# Patient Record
Sex: MALE | Race: WHITE
[De-identification: names, ages, dates, MRNs, and addresses within clinical notes are randomized per-mention and may not be internally consistent; named-entity substitution may affect disease eponyms.]

---

## 2023-11-02 RX ORDER — OMEPRAZOLE 20 MG/1
20 TABLET, DELAYED RELEASE ORAL 2 TIMES DAILY
COMMUNITY
Start: 2023-06-27

## 2023-11-02 RX ORDER — ONDANSETRON 4 MG/1
4 TABLET, ORALLY DISINTEGRATING ORAL EVERY 8 HOURS PRN
COMMUNITY
Start: 2023-08-02

## 2023-11-02 RX ORDER — FUROSEMIDE 20 MG/1
20 TABLET ORAL DAILY
COMMUNITY
Start: 2023-10-04

## 2023-11-02 RX ORDER — ERGOCALCIFEROL 1.25 MG/1
50000 CAPSULE ORAL
COMMUNITY
Start: 2023-10-11

## 2023-11-02 RX ORDER — NICOTINE POLACRILEX 2 MG
1 LOZENGE BUCCAL DAILY
COMMUNITY
Start: 2023-10-23

## 2023-11-02 RX ORDER — LACTULOSE 10 G/10G
45 SOLUTION ORAL 3 TIMES DAILY
COMMUNITY
Start: 2023-10-23

## 2023-11-02 RX ORDER — LANOLIN ALCOHOL/MO/W.PET/CERES
100 CREAM (GRAM) TOPICAL DAILY
COMMUNITY
Start: 2023-06-30

## 2023-11-02 RX ORDER — POLYETHYLENE GLYCOL 3350 17 G/17G
17 POWDER, FOR SOLUTION ORAL DAILY
COMMUNITY
Start: 2023-10-04 | End: 2023-11-25

## 2023-11-02 RX ORDER — SPIRONOLACTONE 50 MG/1
50 TABLET, FILM COATED ORAL 2 TIMES DAILY
COMMUNITY
Start: 2023-10-11

## 2023-11-06 ENCOUNTER — HOSPITAL ENCOUNTER (OUTPATIENT)
Facility: HOSPITAL | Age: 43
Setting detail: SPECIMEN
Discharge: HOME | End: 2023-11-09

## 2023-11-06 ENCOUNTER — OFFICE VISIT (OUTPATIENT)
Age: 43
End: 2023-11-06
Payer: COMMERCIAL

## 2023-11-06 VITALS
OXYGEN SATURATION: 99 % | HEIGHT: 70 IN | WEIGHT: 155 LBS | BODY MASS INDEX: 22.19 KG/M2 | TEMPERATURE: 97.7 F | SYSTOLIC BLOOD PRESSURE: 134 MMHG | DIASTOLIC BLOOD PRESSURE: 95 MMHG | HEART RATE: 99 BPM

## 2023-11-06 DIAGNOSIS — K76.9 PLEURAL EFFUSION ASSOCIATED WITH HEPATIC DISORDER: ICD-10-CM

## 2023-11-06 DIAGNOSIS — K76.82 HEPATIC ENCEPHALOPATHY (HCC): ICD-10-CM

## 2023-11-06 DIAGNOSIS — K70.30 ALCOHOLIC CIRRHOSIS OF LIVER WITHOUT ASCITES (HCC): Primary | ICD-10-CM

## 2023-11-06 DIAGNOSIS — Z95.828 S/P TIPS (TRANSJUGULAR INTRAHEPATIC PORTOSYSTEMIC SHUNT): ICD-10-CM

## 2023-11-06 DIAGNOSIS — K70.30 ALCOHOLIC CIRRHOSIS OF LIVER WITHOUT ASCITES (HCC): ICD-10-CM

## 2023-11-06 DIAGNOSIS — J91.8 PLEURAL EFFUSION ASSOCIATED WITH HEPATIC DISORDER: ICD-10-CM

## 2023-11-06 DIAGNOSIS — K70.9 ALCOHOL INDUCED LIVER DISORDER (HCC): ICD-10-CM

## 2023-11-06 DIAGNOSIS — F10.91 ALCOHOL USE DISORDER IN REMISSION: ICD-10-CM

## 2023-11-06 PROBLEM — I85.00 ESOPHAGEAL VARICES (HCC): Status: ACTIVE | Noted: 2023-11-06

## 2023-11-06 PROBLEM — K74.60 CIRRHOSIS (HCC): Status: ACTIVE | Noted: 2023-11-06

## 2023-11-06 PROBLEM — R18.8 ASCITES: Status: ACTIVE | Noted: 2023-11-06

## 2023-11-06 LAB
AMMONIA PLAS-SCNC: 91 UMOL/L (ref 11–32)
LABCORP SPECIMEN COLLECTION: NORMAL

## 2023-11-06 PROCEDURE — 82140 ASSAY OF AMMONIA: CPT

## 2023-11-06 PROCEDURE — 99205 OFFICE O/P NEW HI 60 MIN: CPT | Performed by: INTERNAL MEDICINE

## 2023-11-06 RX ORDER — HYDROCODONE BITARTRATE AND ACETAMINOPHEN 5; 325 MG/1; MG/1
TABLET ORAL
COMMUNITY
Start: 2023-08-28 | End: 2023-11-25

## 2023-11-06 RX ORDER — LACTULOSE 10 G/15ML
SOLUTION ORAL
COMMUNITY
Start: 2023-10-24 | End: 2023-11-06

## 2023-11-06 RX ORDER — OXYCODONE HYDROCHLORIDE 5 MG/1
5 TABLET ORAL EVERY 6 HOURS PRN
COMMUNITY
Start: 2023-08-03 | End: 2023-11-25

## 2023-11-06 RX ORDER — AMLODIPINE BESYLATE 5 MG/1
TABLET ORAL
COMMUNITY
Start: 2018-09-10 | End: 2023-11-25

## 2023-11-06 NOTE — PROGRESS NOTES
history and multiple medical issues, performing the examination, explaining and then documenting the natural history of the liver disease, cirrhosis, the complications of cirrhosis, treatment options, various issues related to liver transplantation.       Victor Hugo Aguilar MD  [facility]  [address], [address] [place] [address]  [id number] [place] [address], [address]

## 2023-11-07 LAB
ALBUMIN SERPL-MCNC: 3.7 G/DL (ref 4.1–5.1)
ALP SERPL-CCNC: 99 IU/L (ref 44–121)
ALT SERPL-CCNC: 25 IU/L (ref 0–44)
AST SERPL-CCNC: 48 IU/L (ref 0–40)
BASOPHILS # BLD AUTO: 0.1 X10E3/UL (ref 0–0.2)
BASOPHILS NFR BLD AUTO: 1 %
BILIRUB DIRECT SERPL-MCNC: 2.13 MG/DL (ref 0–0.4)
BILIRUB SERPL-MCNC: 5.4 MG/DL (ref 0–1.2)
BUN SERPL-MCNC: 24 MG/DL (ref 6–24)
BUN/CREAT SERPL: 20 (ref 9–20)
CALCIUM SERPL-MCNC: 9.9 MG/DL (ref 8.7–10.2)
CHLORIDE SERPL-SCNC: 99 MMOL/L (ref 96–106)
CO2 SERPL-SCNC: 17 MMOL/L (ref 20–29)
CREAT SERPL-MCNC: 1.2 MG/DL (ref 0.76–1.27)
EGFRCR SERPLBLD CKD-EPI 2021: 76 ML/MIN/1.73
EOSINOPHIL # BLD AUTO: 0.1 X10E3/UL (ref 0–0.4)
EOSINOPHIL NFR BLD AUTO: 1 %
ERYTHROCYTE [DISTWIDTH] IN BLOOD BY AUTOMATED COUNT: 13.1 % (ref 11.6–15.4)
GLUCOSE SERPL-MCNC: 231 MG/DL (ref 70–99)
HCT VFR BLD AUTO: 34.3 % (ref 37.5–51)
HGB BLD-MCNC: 12.4 G/DL (ref 13–17.7)
IMM GRANULOCYTES # BLD AUTO: 0 X10E3/UL (ref 0–0.1)
IMM GRANULOCYTES NFR BLD AUTO: 0 %
INR PPP: 1.2 (ref 0.9–1.2)
LYMPHOCYTES # BLD AUTO: 1.3 X10E3/UL (ref 0.7–3.1)
LYMPHOCYTES NFR BLD AUTO: 27 %
MCH RBC QN AUTO: 35.4 PG (ref 26.6–33)
MCHC RBC AUTO-ENTMCNC: 36.2 G/DL (ref 31.5–35.7)
MCV RBC AUTO: 98 FL (ref 79–97)
MONOCYTES # BLD AUTO: 0.6 X10E3/UL (ref 0.1–0.9)
MONOCYTES NFR BLD AUTO: 13 %
NEUTROPHILS # BLD AUTO: 2.8 X10E3/UL (ref 1.4–7)
NEUTROPHILS NFR BLD AUTO: 58 %
PLATELET # BLD AUTO: 101 X10E3/UL (ref 150–450)
POTASSIUM SERPL-SCNC: 5.3 MMOL/L (ref 3.5–5.2)
PROT SERPL-MCNC: 7.6 G/DL (ref 6–8.5)
PROTHROMBIN TIME: 13.2 SEC (ref 9.1–12)
RBC # BLD AUTO: 3.5 X10E6/UL (ref 4.14–5.8)
SODIUM SERPL-SCNC: 131 MMOL/L (ref 134–144)
WBC # BLD AUTO: 4.8 X10E3/UL (ref 3.4–10.8)

## 2023-11-08 LAB
AFP L3 MFR SERPL: 10.6 % (ref 0–9.9)
AFP SERPL-MCNC: 9.5 NG/ML (ref 0–6.9)
SPECIMEN STATUS REPORT: NORMAL

## 2023-11-10 LAB — AMMONIA PLAS-MCNC: 224 UG/DL (ref 40–200)

## 2023-11-11 LAB
PETH BLD QL SCN: NEGATIVE
PETH BLD-MCNC: NEGATIVE NG/ML

## 2023-12-04 ENCOUNTER — HOSPITAL ENCOUNTER (OUTPATIENT)
Facility: HOSPITAL | Age: 43
Setting detail: SPECIMEN
Discharge: HOME | End: 2023-12-07

## 2023-12-04 DIAGNOSIS — K74.60 CIRRHOSIS OF LIVER WITHOUT ASCITES, UNSPECIFIED HEPATIC CIRRHOSIS TYPE (HCC): ICD-10-CM

## 2023-12-04 LAB — LABCORP SPECIMEN COLLECTION: NORMAL

## 2023-12-05 LAB
A1AT SERPL-MCNC: 164 MG/DL (ref 101–187)
ABO GROUP BLD: NORMAL
BUN SERPL-MCNC: 17 MG/DL (ref 6–24)
BUN/CREAT SERPL: 17 (ref 9–20)
CALCIUM SERPL-MCNC: 10 MG/DL (ref 8.7–10.2)
CHLORIDE SERPL-SCNC: 101 MMOL/L (ref 96–106)
CHOLEST SERPL-MCNC: 194 MG/DL (ref 100–199)
CMV IGG SERPL IA-ACNC: <0.6 U/ML (ref 0–0.59)
CMV IGM SERPL IA-ACNC: <30 AU/ML (ref 0–29.9)
CO2 SERPL-SCNC: 15 MMOL/L (ref 20–29)
CREAT SERPL-MCNC: 1.03 MG/DL (ref 0.76–1.27)
EGFRCR SERPLBLD CKD-EPI 2021: 92 ML/MIN/1.73
GLUCOSE SERPL-MCNC: 166 MG/DL (ref 70–99)
HBA1C MFR BLD: 5.6 % (ref 4.8–5.6)
HDLC SERPL-MCNC: 71 MG/DL
HIV 1+2 AB+HIV1 P24 AG SERPL QL IA: NON REACTIVE
HSV1 IGG SER IA-ACNC: 19 INDEX (ref 0–0.9)
HSV2 IGG SER IA-ACNC: <0.91 INDEX (ref 0–0.9)
INR PPP: 1.2 (ref 0.9–1.2)
LDLC SERPL CALC-MCNC: 108 MG/DL (ref 0–99)
POTASSIUM SERPL-SCNC: 4.1 MMOL/L (ref 3.5–5.2)
PROTHROMBIN TIME: 13.2 SEC (ref 9.1–12)
RH BLD: NEGATIVE
SMA IGG SER-ACNC: 33 UNITS (ref 0–19)
SODIUM SERPL-SCNC: 137 MMOL/L (ref 134–144)
SPECIMEN STATUS REPORT: NORMAL
TREPONEMA PALLIDUM IGG+IGM AB [PRESENCE] IN SERUM OR PLASMA BY IMMUNOASSAY: NON REACTIVE
TRIGL SERPL-MCNC: 82 MG/DL (ref 0–149)
TSH SERPL DL<=0.005 MIU/L-ACNC: 2.25 UIU/ML (ref 0.45–4.5)
VLDLC SERPL CALC-MCNC: 15 MG/DL (ref 5–40)
VZV IGG SER IA-ACNC: >4000 INDEX

## 2023-12-06 LAB
ALBUMIN SERPL-MCNC: 3.7 G/DL (ref 4.1–5.1)
ALP SERPL-CCNC: 105 IU/L (ref 44–121)
ALT SERPL-CCNC: 20 IU/L (ref 0–44)
AST SERPL-CCNC: 40 IU/L (ref 0–40)
BASOPHILS # BLD AUTO: 0.1 X10E3/UL (ref 0–0.2)
BASOPHILS NFR BLD AUTO: 1 %
BILIRUB DIRECT SERPL-MCNC: 1.66 MG/DL (ref 0–0.4)
BILIRUB SERPL-MCNC: 4.1 MG/DL (ref 0–1.2)
EOSINOPHIL # BLD AUTO: 0.1 X10E3/UL (ref 0–0.4)
EOSINOPHIL NFR BLD AUTO: 2 %
ERYTHROCYTE [DISTWIDTH] IN BLOOD BY AUTOMATED COUNT: 12.1 % (ref 11.6–15.4)
HCT VFR BLD AUTO: 32.5 % (ref 37.5–51)
HGB BLD-MCNC: 12.2 G/DL (ref 13–17.7)
IMM GRANULOCYTES # BLD AUTO: 0 X10E3/UL (ref 0–0.1)
IMM GRANULOCYTES NFR BLD AUTO: 0 %
LYMPHOCYTES # BLD AUTO: 1.5 X10E3/UL (ref 0.7–3.1)
LYMPHOCYTES NFR BLD AUTO: 30 %
MCH RBC QN AUTO: 36.4 PG (ref 26.6–33)
MCHC RBC AUTO-ENTMCNC: 37.5 G/DL (ref 31.5–35.7)
MCV RBC AUTO: 97 FL (ref 79–97)
MONOCYTES # BLD AUTO: 0.5 X10E3/UL (ref 0.1–0.9)
MONOCYTES NFR BLD AUTO: 11 %
MORPHOLOGY BLD-IMP: ABNORMAL
NEUTROPHILS # BLD AUTO: 2.8 X10E3/UL (ref 1.4–7)
NEUTROPHILS NFR BLD AUTO: 56 %
PLATELET # BLD AUTO: 99 X10E3/UL (ref 150–450)
PROT SERPL-MCNC: 7.8 G/DL (ref 6–8.5)
RBC # BLD AUTO: 3.35 X10E6/UL (ref 4.14–5.8)
WBC # BLD AUTO: 5 X10E3/UL (ref 3.4–10.8)

## 2023-12-07 LAB
AMPHETAMINES BLD QL SCN: NEGATIVE NG/ML
BARBITURATES SERPLBLD QL: NEGATIVE UG/ML
CANNABINOIDS BLD QL SCN: NEGATIVE NG/ML
COCAINE+BZE SERPLBLD QL SCN: NEGATIVE NG/ML
EBV NA IGG SER IA-ACNC: >600 U/ML (ref 0–17.9)
EBV VCA IGG SER IA-ACNC: >600 U/ML (ref 0–17.9)
EBV VCA IGM SER IA-ACNC: <36 U/ML (ref 0–35.9)
GAMMA INTERFERON BACKGROUND BLD IA-ACNC: 0 IU/ML
M TB IFN-G BLD-IMP: NEGATIVE
M TB IFN-G CD4+ T-CELLS BLD-ACNC: 0.01 IU/ML
M TBIFN-G CD4+ CD8+T-CELLS BLD-ACNC: 0 IU/ML
MITOGEN IGNF BLD-ACNC: >10 IU/ML
OPIATES BLD QL SCN: NEGATIVE NG/ML
OXYCODONE+OXYMORPHONE SERPLBLD QL SCN: NEGATIVE NG/ML
PCP BLD QL SCN: NEGATIVE NG/ML
SERVICE CMNT-IMP: ABNORMAL
SERVICE CMNT-IMP: NORMAL

## 2023-12-09 LAB
PETH BLD QL SCN: NEGATIVE
PETH BLD-MCNC: NEGATIVE NG/ML

## 2023-12-13 LAB
ANA SER QL IF: POSITIVE
ANA SPECKLED TITR SER: ABNORMAL {TITER}
LABORATORY COMMENT REPORT: ABNORMAL

## 2024-01-09 RX ORDER — PHENOL 1.4 %
AEROSOL, SPRAY (ML) MUCOUS MEMBRANE
COMMUNITY
Start: 2023-12-07

## 2024-01-10 ENCOUNTER — OFFICE VISIT (OUTPATIENT)
Age: 44
End: 2024-01-10

## 2024-01-10 VITALS
WEIGHT: 166 LBS | BODY MASS INDEX: 23.77 KG/M2 | SYSTOLIC BLOOD PRESSURE: 127 MMHG | HEART RATE: 82 BPM | HEIGHT: 70 IN | DIASTOLIC BLOOD PRESSURE: 84 MMHG | OXYGEN SATURATION: 99 %

## 2024-01-10 DIAGNOSIS — Z01.818 ENCOUNTER FOR PRE-TRANSPLANT EVALUATION FOR LIVER TRANSPLANT: ICD-10-CM

## 2024-01-10 DIAGNOSIS — K74.60 CIRRHOSIS OF LIVER WITHOUT ASCITES, UNSPECIFIED HEPATIC CIRRHOSIS TYPE (HCC): ICD-10-CM

## 2024-01-10 DIAGNOSIS — K74.60 CIRRHOSIS OF LIVER WITHOUT ASCITES, UNSPECIFIED HEPATIC CIRRHOSIS TYPE (HCC): Primary | ICD-10-CM

## 2024-01-10 RX ORDER — CITALOPRAM HYDROBROMIDE 10 MG/1
10 TABLET ORAL DAILY
COMMUNITY
Start: 2024-01-08

## 2024-01-10 RX ORDER — OMEPRAZOLE 40 MG/1
CAPSULE, DELAYED RELEASE ORAL
COMMUNITY
Start: 2024-01-08

## 2024-01-10 RX ORDER — RIFAXIMIN 550 MG/1
550 TABLET ORAL 2 TIMES DAILY
COMMUNITY
Start: 2023-12-30

## 2024-01-10 NOTE — PATIENT INSTRUCTIONS
I will fax orders for a chest x-ray, abdominal MRI, and bone density to [facility]. Please call the scheduling department next week, as it can take 24-48 hours for the orders to be put into the system    Phone: [phone]

## 2024-01-10 NOTE — PROGRESS NOTES
(PRILOSEC OTC) 20 mg, Oral, 2 TIMES DAILY    omeprazole (PRILOSEC) 40 MG delayed release capsule     ondansetron (ZOFRAN-ODT) 4 mg, Oral, EVERY 8 HOURS PRN    spironolactone (ALDACTONE) 50 mg, Oral, DAILY    thiamine 100 mg, Oral, DAILY    vitamin D (ERGOCALCIFEROL) 50,000 Units, Oral, EVERY 7 DAYS    Xifaxan 550 mg, Oral, 2 TIMES DAILY       SYSTEM REVIEW NOT RELATED TO LIVER DISEASE OR REVIEWED ABOVE:  Constitution systems: Negative for fever, chills, weight gain, weight loss.   Eyes: Negative for visual changes.  ENT: Negative for sore throat, painful swallowing.   Respiratory: Negative for cough, hemoptysis, SOB.   Cardiology: Negative for chest pain, palpitations.  GI:  Negative for constipation or diarrhea.  : Negative for urinary frequency, dysuria, hematuria, nocturia.   Skin: Negative for rash.  Hematology: Negative for easy bruising, blood clots.    Musculo-skelatal: Negative for back pain, muscle pain, weakness.  Neurologic: Negative for headaches, dizziness, vertigo, memory problems not related to HE.  Psychology: Negative for anxiety, depression.       FAMILY HISTORY:  The father  of MI.    The mother Has/had the following chronic disease(s): heart disease.    There is no family history of liver disease.        SOCIAL HISTORY:  The patient is .    The patient has 3 children,   The patient stopped using tobacco products in .    The patient will occasional vape nicotine  The patient has been abstinent from alcohol since 2023.    The patient used to work as  and then as [employer]   The patient has not worked since 2023.    The patient has applied for disability      PHYSICAL EXAMINATION:  /84 (Site: Left Upper Arm, Position: Sitting, Cuff Size: Medium Adult)   Pulse 82   Ht 1.778 m (5' 10\")   Wt 75.3 kg (166 lb)   SpO2 99%   BMI 23.82 kg/m²     General: No acute distress.   Eyes: Sclera anicteric.   ENT: No oral lesions.  Thyroid normal.  Nodes: No

## 2024-01-11 ENCOUNTER — CLINICAL DOCUMENTATION (OUTPATIENT)
Age: 44
End: 2024-01-11

## 2024-01-18 ENCOUNTER — TELEPHONE (OUTPATIENT)
Age: 44
End: 2024-01-18

## 2024-01-18 NOTE — TELEPHONE ENCOUNTER
Received VM from patient indicating he's had labs drawn, testing has been scheduled, and the chest x-ray order needs to be faxed.     Re-faxed x-ray order to [facility] scheduling at [phone]. No lab results available via [facility] Everywhere.     Return call placed to patient. He confirmed labs were drawn yesterday. I told him results will be reviewed with Dr. Kennedy when available. MRI is scheduled on 1/28/2024 and bone density is scheduled on 2/5/2024.

## 2024-01-19 LAB
INR PPP: 1.3 (ref 0.9–1.2)
PROTHROMBIN TIME: 13.6 SEC (ref 9.1–12)
SPECIMEN STATUS REPORT: NORMAL

## 2024-02-08 ENCOUNTER — CLINICAL DOCUMENTATION (OUTPATIENT)
Age: 44
End: 2024-02-08

## 2024-02-08 NOTE — PROGRESS NOTES
2/8/2024    Rcvd OV note from [facility] dos 2/7/2024.    Scanned into media for reference.    efs

## 2024-04-10 ENCOUNTER — OFFICE VISIT (OUTPATIENT)
Age: 44
End: 2024-04-10
Payer: MEDICAID

## 2024-04-10 VITALS
WEIGHT: 181.2 LBS | BODY MASS INDEX: 25.94 KG/M2 | SYSTOLIC BLOOD PRESSURE: 125 MMHG | TEMPERATURE: 98.5 F | HEART RATE: 80 BPM | DIASTOLIC BLOOD PRESSURE: 84 MMHG | HEIGHT: 70 IN | RESPIRATION RATE: 20 BRPM | OXYGEN SATURATION: 98 %

## 2024-04-10 DIAGNOSIS — K70.30 ALCOHOLIC CIRRHOSIS OF LIVER WITHOUT ASCITES (HCC): Primary | ICD-10-CM

## 2024-04-10 PROCEDURE — 99214 OFFICE O/P EST MOD 30 MIN: CPT | Performed by: INTERNAL MEDICINE

## 2024-04-10 RX ORDER — GLIMEPIRIDE 1 MG/1
1 TABLET ORAL
COMMUNITY
Start: 2024-04-09

## 2024-04-10 NOTE — PROGRESS NOTES
DAILY    omeprazole (PRILOSEC OTC) 20 mg, 2 TIMES DAILY    omeprazole (PRILOSEC) 40 MG delayed release capsule     ondansetron (ZOFRAN-ODT) 4 mg, EVERY 8 HOURS PRN    spironolactone (ALDACTONE) 50 mg, Oral, DAILY    thiamine 100 mg, Oral, DAILY    vitamin D (ERGOCALCIFEROL) 50,000 Units, Oral, EVERY 7 DAYS    Xifaxan 550 mg, Oral, 2 TIMES DAILY       SYSTEM REVIEW NOT RELATED TO LIVER DISEASE OR REVIEWED ABOVE:  Constitution systems: Negative for fever, chills, weight gain, weight loss.   Eyes: Negative for visual changes.  ENT: Negative for sore throat, painful swallowing.   Respiratory: Negative for cough, hemoptysis, SOB.   Cardiology: Negative for chest pain, palpitations.  GI:  Negative for constipation or diarrhea.  : Negative for urinary frequency, dysuria, hematuria, nocturia.   Skin: Negative for rash.  Hematology: Negative for easy bruising, blood clots.    Musculo-skelatal: Negative for back pain, muscle pain, weakness.  Neurologic: Negative for headaches, dizziness, vertigo, memory problems not related to HE.  Psychology: Negative for anxiety, depression.       FAMILY HISTORY:  The father  of MI.    The mother Has/had the following chronic disease(s): heart disease.    There is no family history of liver disease.        SOCIAL HISTORY:  The patient is .    The patient has 3 children,   The patient stopped using tobacco products in .    The patient will occasional vape nicotine  The patient has been abstinent from alcohol since 2023.    The patient used to work as  and then as [employer]   The patient has not worked since 2023.    The patient has applied for disability      PHYSICAL EXAMINATION:  /84 (Site: Left Upper Arm, Position: Sitting, Cuff Size: Small Adult)   Pulse 80   Temp 98.5 °F (36.9 °C) (Oral)   Resp 20   Ht 1.778 m (5' 10\")   Wt 82.2 kg (181 lb 3.2 oz)   SpO2 98%   BMI 26.00 kg/m²     General: No acute distress.   Eyes: Sclera anicteric.

## 2024-11-06 ENCOUNTER — OFFICE VISIT (OUTPATIENT)
Age: 44
End: 2024-11-06
Payer: MEDICAID

## 2024-11-06 VITALS
OXYGEN SATURATION: 98 % | TEMPERATURE: 98.2 F | HEART RATE: 87 BPM | BODY MASS INDEX: 31.58 KG/M2 | SYSTOLIC BLOOD PRESSURE: 130 MMHG | HEIGHT: 70 IN | WEIGHT: 220.6 LBS | DIASTOLIC BLOOD PRESSURE: 97 MMHG

## 2024-11-06 DIAGNOSIS — K70.30 ALCOHOLIC CIRRHOSIS OF LIVER WITHOUT ASCITES (HCC): ICD-10-CM

## 2024-11-06 DIAGNOSIS — K70.30 ALCOHOLIC CIRRHOSIS OF LIVER WITHOUT ASCITES (HCC): Primary | ICD-10-CM

## 2024-11-06 PROCEDURE — 99214 OFFICE O/P EST MOD 30 MIN: CPT | Performed by: NURSE PRACTITIONER

## 2024-11-06 RX ORDER — SELENIUM SULFIDE 2.5 MG/100ML
1 LOTION TOPICAL PRN
COMMUNITY
Start: 2024-08-23

## 2024-11-06 RX ORDER — KETOCONAZOLE 20 MG/G
CREAM TOPICAL
COMMUNITY
Start: 2024-08-31

## 2024-11-06 RX ORDER — CYPROHEPTADINE HYDROCHLORIDE 4 MG/1
4 TABLET ORAL 3 TIMES DAILY
Qty: 90 TABLET | Refills: 1 | Status: SHIPPED | OUTPATIENT
Start: 2024-11-06

## 2024-11-06 NOTE — PATIENT INSTRUCTIONS
Please begin taking a daily probiotic.  Please begin taking daily branch chain amino acids.  Please begin taking a daily zinc supplement.  Please take a daily multivitamin which contains vitamins A, D, E, and K.

## 2024-11-06 NOTE — PROGRESS NOTES
- 9.9 % 10.6 (H)      SEROLOGIES:  2023.  HB surface antigen negative, anti-HB surface positive, anti-HCV negative     Latest Ref Rng 2023   [facility] - Serologies     KIMBERLY, IFA Negative  Positive !    KIMBERLY Pattern <1:40  1:160 (H)    ASMCA 0 - 19 Units 33 (H)    Alpha-1 antitrypsin level 101 - 187 mg/dL 164      LIVER HISTOLOGY:  Not available or performed    ENDOSCOPIC PROCEDURES:  2023.  EGD by Geissinger.  Esophageal varices.  Banding performed.  3/2023.  EGD by Geissenger.  No esophageal varices.  2023.  EGD by Geissinger.  Esophageal varices.  Banding performed.  2023.  EGD by Geissenger.  No esophageal varices.    RADIOLOGY:  2024.  Ultrasound of the liver.  Sonographic findings compatible with chronic hepatocellular disease with TIPS in place. No ascites. No liver masses.     OTHER TESTIN2023.  ECHO.  LVEF 62%, RV normal, no TR.    FOLLOW-UP:  All of the issues listed above in the Assessment and Plan were discussed with the patient.  All questions were answered.  The patient expressed a clear understanding of the above.    Follow-up [facility] in 6 months.  He will see Dr Loera in 3 months. We will continue to alternate appointments every 3 months.      COLLETTE Taylor-JAMIE  [facility]  [address]   [facility] [address], [address]   [place] [address] [phone]  H

## 2024-11-07 ENCOUNTER — CLINICAL DOCUMENTATION (OUTPATIENT)
Age: 44
End: 2024-11-07

## 2025-04-22 ENCOUNTER — TELEPHONE (OUTPATIENT)
Age: 45
End: 2025-04-22

## 2025-04-22 NOTE — TELEPHONE ENCOUNTER
spoke with the pt and explain to him that our office is closing and suggest he get in touch with his PCP or  insurance carrier to obtain a new referral to another hepatologist